# Patient Record
Sex: FEMALE | Race: WHITE | NOT HISPANIC OR LATINO | Employment: FULL TIME | URBAN - NONMETROPOLITAN AREA
[De-identification: names, ages, dates, MRNs, and addresses within clinical notes are randomized per-mention and may not be internally consistent; named-entity substitution may affect disease eponyms.]

---

## 2019-12-29 ENCOUNTER — OFFICE VISIT (OUTPATIENT)
Dept: URGENT CARE | Facility: CLINIC | Age: 34
End: 2019-12-29
Payer: COMMERCIAL

## 2019-12-29 VITALS
TEMPERATURE: 98.5 F | BODY MASS INDEX: 26.53 KG/M2 | HEIGHT: 67 IN | RESPIRATION RATE: 16 BRPM | WEIGHT: 169 LBS | OXYGEN SATURATION: 97 % | HEART RATE: 69 BPM | DIASTOLIC BLOOD PRESSURE: 78 MMHG | SYSTOLIC BLOOD PRESSURE: 118 MMHG

## 2019-12-29 DIAGNOSIS — J01.90 ACUTE NON-RECURRENT SINUSITIS, UNSPECIFIED LOCATION: ICD-10-CM

## 2019-12-29 PROCEDURE — 99203 OFFICE O/P NEW LOW 30 MIN: CPT | Performed by: FAMILY MEDICINE

## 2019-12-29 RX ORDER — AMOXICILLIN AND CLAVULANATE POTASSIUM 875; 125 MG/1; MG/1
1 TABLET, FILM COATED ORAL 2 TIMES DAILY
Qty: 10 TAB | Refills: 0 | Status: SHIPPED | OUTPATIENT
Start: 2019-12-29 | End: 2020-01-03

## 2019-12-29 ASSESSMENT — ENCOUNTER SYMPTOMS: COUGH: 1

## 2019-12-29 NOTE — PATIENT INSTRUCTIONS
Oral antibiotic twice daily as directed for 5 days  Recommend nasal saline irrigation also known as Selena pot  You could benefit from nasal decongestant, for instance Claritin-D daily as needed  Follow up if not significantly improved as expected in 7 days, sooner if any worsening or new symptoms

## 2019-12-29 NOTE — PROGRESS NOTES
"subjective:      [place]ana Koenig is a 34 y.o. female who presents with Sinus Problem (started 12- )            This is a new problem.  34-year-old otherwise healthy presenting for 10-day history of progressive worsening sinus congestion and pain.  She denies any history of sinus surgery or frequent sinus infection.  No fever or chills reported.  She has been using nasal saline spray without improvement.  She is not taking antihistamine or decongestant.      Review of Systems   Respiratory: Positive for cough.    All other systems reviewed and are negative.         Objective:     /78   Pulse 69   Temp 36.9 °C (98.5 °F) (Temporal)   Resp 16   Ht 1.702 m (5' 7\")   Wt 76.7 kg (169 lb)   SpO2 97%   BMI 26.47 kg/m²      Physical Exam  Constitutional:       General: She is not in acute distress.     Appearance: She is not ill-appearing, toxic-appearing or diaphoretic.   HENT:      Head: Normocephalic and atraumatic.      Right Ear: Tympanic membrane, ear canal and external ear normal.      Left Ear: Tympanic membrane, ear canal and external ear normal.      Nose: Congestion present.      Right Sinus: Maxillary sinus tenderness present. No frontal sinus tenderness.      Left Sinus: Maxillary sinus tenderness present. No frontal sinus tenderness.      Mouth/Throat:      Mouth: Mucous membranes are moist. No injury.      Pharynx: Oropharynx is clear. Uvula midline. No pharyngeal swelling, oropharyngeal exudate, posterior oropharyngeal erythema or uvula swelling.      Tonsils: No tonsillar exudate or tonsillar abscesses.   Eyes:      General: No scleral icterus.     Conjunctiva/sclera: Conjunctivae normal.   Neck:      Musculoskeletal: Neck supple. No muscular tenderness.   Cardiovascular:      Rate and Rhythm: Normal rate and regular rhythm.      Heart sounds: No murmur. No friction rub. No gallop.    Pulmonary:      Effort: Pulmonary effort is normal. No respiratory distress.      Breath sounds: No " stridor. No wheezing, rhonchi or rales.   Lymphadenopathy:      Cervical: No cervical adenopathy.   Skin:     General: Skin is warm.      Coloration: Skin is not jaundiced or pale.      Findings: No bruising, erythema or rash.   Neurological:      Mental Status: She is alert and oriented to person, place, and time.   Psychiatric:         Mood and Affect: Mood normal.             Assessment/Plan:       1. Acute non-recurrent sinusitis, unspecified location  - amoxicillin-clavulanate (AUGMENTIN) 875-125 MG Tab; Take 1 Tab by mouth 2 times a day for 5 days.  Dispense: 10 Tab; Refill: 0      Continue symptomatic care  Plan per orders and instructions  Warning signs reviewed

## 2021-04-20 ENCOUNTER — HOSPITAL ENCOUNTER (INPATIENT)
Facility: MEDICAL CENTER | Age: 36
LOS: 1 days | DRG: 833 | End: 2021-04-22
Attending: OBSTETRICS & GYNECOLOGY | Admitting: OBSTETRICS & GYNECOLOGY
Payer: COMMERCIAL

## 2021-04-20 LAB
ALBUMIN SERPL BCP-MCNC: 3.9 G/DL (ref 3.2–4.9)
ALBUMIN/GLOB SERPL: 1.1 G/DL
ALP SERPL-CCNC: 83 U/L (ref 30–99)
ALT SERPL-CCNC: 30 U/L (ref 2–50)
ANION GAP SERPL CALC-SCNC: 12 MMOL/L (ref 7–16)
APPEARANCE UR: ABNORMAL
AST SERPL-CCNC: 21 U/L (ref 12–45)
BACTERIA #/AREA URNS HPF: ABNORMAL /HPF
BASOPHILS # BLD AUTO: 0.3 % (ref 0–1.8)
BASOPHILS # BLD: 0.03 K/UL (ref 0–0.12)
BILIRUB SERPL-MCNC: 0.2 MG/DL (ref 0.1–1.5)
BILIRUB UR QL STRIP.AUTO: NEGATIVE
BUN SERPL-MCNC: 12 MG/DL (ref 8–22)
CALCIUM SERPL-MCNC: 9.8 MG/DL (ref 8.5–10.5)
CHLORIDE SERPL-SCNC: 101 MMOL/L (ref 96–112)
CO2 SERPL-SCNC: 21 MMOL/L (ref 20–33)
COLOR UR: YELLOW
CREAT SERPL-MCNC: 0.49 MG/DL (ref 0.5–1.4)
CREAT UR-MCNC: 61.74 MG/DL
EOSINOPHIL # BLD AUTO: 0.07 K/UL (ref 0–0.51)
EOSINOPHIL NFR BLD: 0.7 % (ref 0–6.9)
EPI CELLS #/AREA URNS HPF: ABNORMAL /HPF
ERYTHROCYTE [DISTWIDTH] IN BLOOD BY AUTOMATED COUNT: 47.4 FL (ref 35.9–50)
GLOBULIN SER CALC-MCNC: 3.6 G/DL (ref 1.9–3.5)
GLUCOSE SERPL-MCNC: 84 MG/DL (ref 65–99)
GLUCOSE UR STRIP.AUTO-MCNC: NEGATIVE MG/DL
HCT VFR BLD AUTO: 36.7 % (ref 37–47)
HGB BLD-MCNC: 12.1 G/DL (ref 12–16)
IMM GRANULOCYTES # BLD AUTO: 0.28 K/UL (ref 0–0.11)
IMM GRANULOCYTES NFR BLD AUTO: 2.6 % (ref 0–0.9)
KETONES UR STRIP.AUTO-MCNC: NEGATIVE MG/DL
LEUKOCYTE ESTERASE UR QL STRIP.AUTO: ABNORMAL
LYMPHOCYTES # BLD AUTO: 1.2 K/UL (ref 1–4.8)
LYMPHOCYTES NFR BLD: 11.3 % (ref 22–41)
MCH RBC QN AUTO: 31.8 PG (ref 27–33)
MCHC RBC AUTO-ENTMCNC: 33 G/DL (ref 33.6–35)
MCV RBC AUTO: 96.6 FL (ref 81.4–97.8)
MICRO URNS: ABNORMAL
MONOCYTES # BLD AUTO: 0.84 K/UL (ref 0–0.85)
MONOCYTES NFR BLD AUTO: 7.9 % (ref 0–13.4)
NEUTROPHILS # BLD AUTO: 8.17 K/UL (ref 2–7.15)
NEUTROPHILS NFR BLD: 77.2 % (ref 44–72)
NITRITE UR QL STRIP.AUTO: NEGATIVE
NRBC # BLD AUTO: 0 K/UL
NRBC BLD-RTO: 0 /100 WBC
PH UR STRIP.AUTO: 7 [PH] (ref 5–8)
PLATELET # BLD AUTO: 231 K/UL (ref 164–446)
PMV BLD AUTO: 10.5 FL (ref 9–12.9)
POTASSIUM SERPL-SCNC: 4.1 MMOL/L (ref 3.6–5.5)
PROT SERPL-MCNC: 7.5 G/DL (ref 6–8.2)
PROT UR QL STRIP: NEGATIVE MG/DL
PROT UR-MCNC: 15 MG/DL (ref 0–15)
PROT/CREAT UR: 243 MG/G (ref 10–107)
RBC # BLD AUTO: 3.8 M/UL (ref 4.2–5.4)
RBC # URNS HPF: ABNORMAL /HPF
RBC UR QL AUTO: NEGATIVE
SARS-COV+SARS-COV-2 AG RESP QL IA.RAPID: NOTDETECTED
SARS-COV-2 RNA RESP QL NAA+PROBE: NOTDETECTED
SODIUM SERPL-SCNC: 134 MMOL/L (ref 135–145)
SP GR UR STRIP.AUTO: 1.01
SPECIMEN SOURCE: NORMAL
SPECIMEN SOURCE: NORMAL
URATE SERPL-MCNC: 4.7 MG/DL (ref 1.9–8.2)
UROBILINOGEN UR STRIP.AUTO-MCNC: 0.2 MG/DL
WBC # BLD AUTO: 10.6 K/UL (ref 4.8–10.8)
WBC #/AREA URNS HPF: ABNORMAL /HPF

## 2021-04-20 PROCEDURE — 700102 HCHG RX REV CODE 250 W/ 637 OVERRIDE(OP): Performed by: OBSTETRICS & GYNECOLOGY

## 2021-04-20 PROCEDURE — 84156 ASSAY OF PROTEIN URINE: CPT | Mod: 91

## 2021-04-20 PROCEDURE — A9270 NON-COVERED ITEM OR SERVICE: HCPCS | Performed by: OBSTETRICS & GYNECOLOGY

## 2021-04-20 PROCEDURE — 80053 COMPREHEN METABOLIC PANEL: CPT

## 2021-04-20 PROCEDURE — 81050 URINALYSIS VOLUME MEASURE: CPT

## 2021-04-20 PROCEDURE — 85025 COMPLETE CBC W/AUTO DIFF WBC: CPT

## 2021-04-20 PROCEDURE — 59025 FETAL NON-STRESS TEST: CPT

## 2021-04-20 PROCEDURE — 87426 SARSCOV CORONAVIRUS AG IA: CPT

## 2021-04-20 PROCEDURE — 36415 COLL VENOUS BLD VENIPUNCTURE: CPT

## 2021-04-20 PROCEDURE — 82570 ASSAY OF URINE CREATININE: CPT

## 2021-04-20 PROCEDURE — U0005 INFEC AGEN DETEC AMPLI PROBE: HCPCS

## 2021-04-20 PROCEDURE — 81001 URINALYSIS AUTO W/SCOPE: CPT

## 2021-04-20 PROCEDURE — U0003 INFECTIOUS AGENT DETECTION BY NUCLEIC ACID (DNA OR RNA); SEVERE ACUTE RESPIRATORY SYNDROME CORONAVIRUS 2 (SARS-COV-2) (CORONAVIRUS DISEASE [COVID-19]), AMPLIFIED PROBE TECHNIQUE, MAKING USE OF HIGH THROUGHPUT TECHNOLOGIES AS DESCRIBED BY CMS-2020-01-R: HCPCS

## 2021-04-20 PROCEDURE — 84550 ASSAY OF BLOOD/URIC ACID: CPT

## 2021-04-20 PROCEDURE — 302449 STATCHG TRIAGE ONLY (STATISTIC)

## 2021-04-20 RX ORDER — LABETALOL 100 MG/1
200 TABLET, FILM COATED ORAL EVERY 8 HOURS
Status: DISCONTINUED | OUTPATIENT
Start: 2021-04-20 | End: 2021-04-21

## 2021-04-20 RX ORDER — NIFEDIPINE 10 MG/1
10 CAPSULE ORAL ONCE
Status: COMPLETED | OUTPATIENT
Start: 2021-04-20 | End: 2021-04-20

## 2021-04-20 RX ORDER — NIFEDIPINE 90 MG/1
90 TABLET, FILM COATED, EXTENDED RELEASE ORAL DAILY
COMMUNITY

## 2021-04-20 RX ORDER — FERROUS GLUCONATE 324(38)MG
324 TABLET ORAL
COMMUNITY

## 2021-04-20 RX ORDER — VITAMIN B COMPLEX
1000 TABLET ORAL DAILY
COMMUNITY

## 2021-04-20 RX ORDER — NIFEDIPINE 30 MG/1
90 TABLET, EXTENDED RELEASE ORAL
Status: DISCONTINUED | OUTPATIENT
Start: 2021-04-21 | End: 2021-04-22 | Stop reason: HOSPADM

## 2021-04-20 RX ADMIN — LABETALOL HYDROCHLORIDE 200 MG: 100 TABLET, FILM COATED ORAL at 22:11

## 2021-04-20 RX ADMIN — LABETALOL HYDROCHLORIDE 200 MG: 100 TABLET, FILM COATED ORAL at 13:29

## 2021-04-20 RX ADMIN — NIFEDIPINE 10 MG: 10 CAPSULE ORAL at 11:51

## 2021-04-20 RX ADMIN — NIFEDIPINE 10 MG: 10 CAPSULE ORAL at 22:40

## 2021-04-20 ASSESSMENT — LIFESTYLE VARIABLES
ALCOHOL_USE: NO
EVER HAD A DRINK FIRST THING IN THE MORNING TO STEADY YOUR NERVES TO GET RID OF A HANGOVER: NO
EVER_SMOKED: NEVER
TOTAL SCORE: 0
CONSUMPTION TOTAL: NEGATIVE
DOES PATIENT WANT TO STOP DRINKING: NO
AVERAGE NUMBER OF DAYS PER WEEK YOU HAVE A DRINK CONTAINING ALCOHOL: 0
ON A TYPICAL DAY WHEN YOU DRINK ALCOHOL HOW MANY DRINKS DO YOU HAVE: 0
TOTAL SCORE: 0
HOW MANY TIMES IN THE PAST YEAR HAVE YOU HAD 5 OR MORE DRINKS IN A DAY: 0
EVER FELT BAD OR GUILTY ABOUT YOUR DRINKING: NO
HAVE PEOPLE ANNOYED YOU BY CRITICIZING YOUR DRINKING: NO
TOTAL SCORE: 0
HAVE YOU EVER FELT YOU SHOULD CUT DOWN ON YOUR DRINKING: NO

## 2021-04-20 ASSESSMENT — COPD QUESTIONNAIRES
DURING THE PAST 4 WEEKS HOW MUCH DID YOU FEEL SHORT OF BREATH: NONE/LITTLE OF THE TIME
COPD SCREENING SCORE: 0
HAVE YOU SMOKED AT LEAST 100 CIGARETTES IN YOUR ENTIRE LIFE: NO/DON'T KNOW
DO YOU EVER COUGH UP ANY MUCUS OR PHLEGM?: NO/ONLY WITH OCCASIONAL COLDS OR INFECTIONS
IN THE PAST 12 MONTHS DO YOU DO LESS THAN YOU USED TO BECAUSE OF YOUR BREATHING PROBLEMS: DISAGREE/UNSURE

## 2021-04-20 ASSESSMENT — PATIENT HEALTH QUESTIONNAIRE - PHQ9
1. LITTLE INTEREST OR PLEASURE IN DOING THINGS: NOT AT ALL
2. FEELING DOWN, DEPRESSED, IRRITABLE, OR HOPELESS: NOT AT ALL
SUM OF ALL RESPONSES TO PHQ9 QUESTIONS 1 AND 2: 0

## 2021-04-20 ASSESSMENT — PAIN SCALES - GENERAL: PAINLEVEL: 0 - NO PAIN

## 2021-04-20 NOTE — CARE PLAN
Problem: Knowledge Deficit  Goal: Patient/Support Person demonstrates understanding regarding condition, prognosis and treatment needs during the pregnancy  Outcome: PROGRESSING AS EXPECTED  Note: Pt is up to date on the POC. She will ask questions as needed and the RN will notify her of any changes.     Problem: Risk for Deep Vein Thrombosis/Venous Thromboembolism  Goal: DVT/VTE Prevention Measures in Place  Outcome: PROGRESSING AS EXPECTED  Note: Pt able to move ambulate to help maintain adequate blood perfusion

## 2021-04-20 NOTE — PROGRESS NOTES
1200. Report from Bibi GALICIA at the bedside. POC discussed and resumed. Saline lock placed, labs drawn and sent. Pt transferred to [id number].     1230. Covid swabs obtained and sent, admission completed. Pt denies uc's, leaking or bleeding and notes +FM. Pt denies HA, spots in her vision, epigastric pain and swelling. Pt states that her EDC is 21= 29.2 weeks. Pt has a history of  delivery via c/s at 29.5 weeks, with a PPH, D&C and blood transfusion.     1900. Report to RN. POC discussed.

## 2021-04-21 LAB
PROT 24H UR-MCNC: 240 MG/24 HR (ref 30–150)
PROT 24H UR-MRATE: 10 MG/DL (ref 0–15)
SPECIMEN VOL UR: 2400 ML

## 2021-04-21 PROCEDURE — 700102 HCHG RX REV CODE 250 W/ 637 OVERRIDE(OP): Performed by: OBSTETRICS & GYNECOLOGY

## 2021-04-21 PROCEDURE — A9270 NON-COVERED ITEM OR SERVICE: HCPCS | Performed by: OBSTETRICS & GYNECOLOGY

## 2021-04-21 PROCEDURE — 302790 HCHG STAT ANTEPARTUM CARE, DAILY

## 2021-04-21 PROCEDURE — 59025 FETAL NON-STRESS TEST: CPT

## 2021-04-21 PROCEDURE — 770002 HCHG ROOM/CARE - OB PRIVATE (112)

## 2021-04-21 RX ORDER — LEVOTHYROXINE SODIUM 0.07 MG/1
75 TABLET ORAL
Status: DISCONTINUED | OUTPATIENT
Start: 2021-04-21 | End: 2021-04-22 | Stop reason: HOSPADM

## 2021-04-21 RX ORDER — LABETALOL 300 MG/1
300 TABLET, FILM COATED ORAL EVERY 8 HOURS
Status: DISCONTINUED | OUTPATIENT
Start: 2021-04-21 | End: 2021-04-22 | Stop reason: HOSPADM

## 2021-04-21 RX ADMIN — NIFEDIPINE 90 MG: 30 TABLET, FILM COATED, EXTENDED RELEASE ORAL at 08:37

## 2021-04-21 RX ADMIN — LABETALOL HYDROCHLORIDE 200 MG: 100 TABLET, FILM COATED ORAL at 14:26

## 2021-04-21 RX ADMIN — LABETALOL HYDROCHLORIDE 200 MG: 100 TABLET, FILM COATED ORAL at 06:13

## 2021-04-21 RX ADMIN — LABETALOL HYDROCHLORIDE 300 MG: 300 TABLET, FILM COATED ORAL at 22:06

## 2021-04-21 RX ADMIN — LEVOTHYROXINE SODIUM 75 MCG: 0.07 TABLET ORAL at 07:12

## 2021-04-21 ASSESSMENT — PAIN DESCRIPTION - PAIN TYPE: TYPE: ACUTE PAIN

## 2021-04-21 NOTE — H&P
DATE OF ADMISSION:  2021     REASON FOR ADMISSION:  Hypertension.     HISTORY OF PRESENT ILLNESS:  This is a 36-year-old  AB3 with an   EDC of 2021 who is currently at 29 weeks and 2 days.  The patient is   known to have history of hypertension and previous pregnancy was complicated   by  birth induced, resulting from hypertension with superimposed   preeclampsia.     The patient was seen today and is currently on Procardia extended release 90   mg per day, labetalol 100 mg 3 times a day and was observed to have a blood   pressure 159/90.  Of note, she has gained approximately 5-1/2 pounds since   2021.  Due to the elevated blood pressure, advised the patient to be   sent to labor and delivery for evaluation for possible superimposed severe   preeclampsia.     In triage, the patient had elevated blood pressure, requiring emergency   antihypertensive therapy, who responded to nifedipine 10 mg p.o.  Due to this   requirement, the patient was therefore admitted for observation and 24-hour   urine total protein.     PAST MEDICAL HISTORY:  She is known hypertensive and is currently on Procardia   extended release 90 mg per day and labetalol 100 mg 3 times a day.  During   this pregnancy, she had a low-risk NIPT result and her 24-hour urine total   protein was negative at the beginning of this pregnancy.     Patient currently denies any headache, visual disturbance or epigastric pain.     Denies any history of asthma, seizures, diabetes, cardiovascular, respiratory,   GI,  or other endocrine disorders.     PREVIOUS OPERATIONS:   section.     PAST OBSTETRICAL HISTORY:  In , delivered at 29 weeks and 5 days liveborn   male, 3 pound 2 ounce,  section for severe hypertension, superimposed   preeclampsia complicated by postpartum hemorrhage.  In 2019, she had   spontaneous AB.  In 2019, she had a second spontaneous AB.  In ,   spontaneous AB.     TRANSFUSIONS:  In .      ALLERGIES:  SULFA CAUSES A RASH.     HABITS:  None.     CURRENT MEDICATIONS:  In addition to the above, she is on Synthroid 75 mcg per   day.     VENEREAL DISEASE HISTORY:  Negative.     FAMILY HISTORY:  Significant for hypertension.     PHYSICAL EXAMINATION:  GENERAL:  Well-developed, well-nourished female, alert and oriented x3, no   acute distress.  HEENT:  Within normal limits.  LUNGS:  Clear.  HEART:  Regular rate and rhythm, normal S1 and S2.  No S3, S4 or murmurs.  ABDOMEN:  Soft, gravid uterus.  EXTREMITIES:  No edema or varicosities.  NEUROLOGIC:  Cranial nerves II-XII grossly intact.     DIAGNOSTIC DATA:  Ultrasound performed today in the office revealed composite   gestational age 31 weeks and 6 days, large for dates.  Amniotic fluid is   normal at 18.5.  Structurally, the baby appears to be normal.     ASSESSMENT:  1.  Intrauterine pregnancy 29 weeks and 2 days.  2.  Chronic hypertension, rule out superimposed preeclampsia.  3.  Rule out unstable hypertension.  4.  Previous  section.  5.  Hypothyroidism.     PLAN:  The patient is admitted and we will evaluate her with a 24-hour urine   total protein and will monitor blood pressures.  We will make adjustments to   her labetalol as she has reached full dose, recommended dosing for nifedipine   XL.  All questions answered to satisfaction.    Time: 75 minutes        ______________________________  MD ESTEFANY SIMS/EVENS    DD:  2021 22:11  DT:  2021 22:53    Job#:  [id number]

## 2021-04-21 NOTE — PROGRESS NOTES
0700: Report received from AZUL Frias RN. POC discussed with pt. All questions answered.   0835: Assessment done. Pt denies UCs, LOF, or VB. Reports +FM. Denies HA, visual changes, or epigastric pain.   0924: External monitors applied. Reactive NST obtained.   1845: Dr. Andrade at bedside. Discussed POC with pt and family.   1900: Report given to ARLETTE Schreiber RN. POC discussed with pt and family.

## 2021-04-21 NOTE — PROGRESS NOTES
1900- Received report from OLAMIDE Feliciano RN. POC discussed, all questions answered.     Pt denies VB, LOF, contraction, HA, blurred vision, or epigastric pain. She states +FM.     2050- RN called Dr. Andrade regarding FHT tracing being broken. He states it is ok and I can take pt off of monitor.    2234- RN called Dr. Andrade regarding pt BP's. Orders received, see manage orders.     0700- Report given to WANDA Camacho RN. POC discussed, all questions answered.

## 2021-04-21 NOTE — CARE PLAN
Problem: Knowledge Deficit  Goal: Patient/Support Person demonstrates understanding regarding condition, prognosis and treatment needs during the pregnancy  Outcome: PROGRESSING AS EXPECTED   -POC discussed with pt. All questions answered.   Problem: Risk for Deep Vein Thrombosis/Venous Thromboembolism  Goal: DVT/VTE Prevention Measures in Place  Outcome: PROGRESSING AS EXPECTED  -Pt ambulating in room.

## 2021-04-22 VITALS
HEIGHT: 66 IN | TEMPERATURE: 98.3 F | DIASTOLIC BLOOD PRESSURE: 80 MMHG | SYSTOLIC BLOOD PRESSURE: 132 MMHG | HEART RATE: 89 BPM | OXYGEN SATURATION: 95 % | RESPIRATION RATE: 17 BRPM | WEIGHT: 210 LBS | BODY MASS INDEX: 33.75 KG/M2

## 2021-04-22 PROCEDURE — A9270 NON-COVERED ITEM OR SERVICE: HCPCS | Performed by: OBSTETRICS & GYNECOLOGY

## 2021-04-22 PROCEDURE — 59025 FETAL NON-STRESS TEST: CPT

## 2021-04-22 PROCEDURE — 700102 HCHG RX REV CODE 250 W/ 637 OVERRIDE(OP): Performed by: OBSTETRICS & GYNECOLOGY

## 2021-04-22 RX ADMIN — LABETALOL HYDROCHLORIDE 300 MG: 300 TABLET, FILM COATED ORAL at 14:06

## 2021-04-22 RX ADMIN — LABETALOL HYDROCHLORIDE 300 MG: 300 TABLET, FILM COATED ORAL at 05:54

## 2021-04-22 RX ADMIN — LEVOTHYROXINE SODIUM 75 MCG: 0.07 TABLET ORAL at 05:54

## 2021-04-22 RX ADMIN — NIFEDIPINE 90 MG: 30 TABLET, FILM COATED, EXTENDED RELEASE ORAL at 08:19

## 2021-04-22 NOTE — PROGRESS NOTES
S: Complaining of IV discomfort.  O: Patient Vitals for the past 24 hrs:   BP Temp Temp src Pulse Resp   04/21/21 1613 148/91 36.9 °C (98.4 °F) Temporal 90 --   04/21/21 1427 153/87 -- -- 88 --   04/21/21 1426 153/87 -- -- -- --   04/21/21 1209 132/85 36.7 °C (98 °F) Temporal 88 18   04/21/21 0835 132/81 36.5 °C (97.7 °F) Temporal 93 16   04/21/21 0613 156/89 36.6 °C (97.8 °F) Temporal 93 --   04/21/21 0423 138/81 36.4 °C (97.5 °F) Temporal 91 17   04/20/21 2334 149/85 36.4 °C (97.6 °F) Temporal 93 15   04/20/21 2301 156/83 -- -- 86 --   04/20/21 2234 (!) 164/91 -- -- 91 --   04/20/21 2213 (!) 171/90 -- -- 87 --   04/20/21 2011 137/97 36.6 °C (97.9 °F) Temporal 91 16   04/20/21 1957 138/93 -- -- 93 --     EFM: Baseline 140 bpm.  Accelerations and moderate variability.    No change in physical exam.    Results for ANDRE LOVE (MRN [id number]) as of 4/21/2021 18:44   Ref. Range 4/20/2021 12:00   Total Protein, 24 Hour Urine Latest Ref Range: 30.0 - 150.0 mg/24 Hr 240.0 (H)   Results for ANDRE LOVE (MRN [id number]) as of 4/21/2021 18:44   Ref. Range 4/20/2021 12:05   Sodium Latest Ref Range: 135 - 145 mmol/L 134 (L)   Potassium Latest Ref Range: 3.6 - 5.5 mmol/L 4.1   Chloride Latest Ref Range: 96 - 112 mmol/L 101   Co2 Latest Ref Range: 20 - 33 mmol/L 21   Anion Gap Latest Ref Range: 7.0 - 16.0  12.0   Glucose Latest Ref Range: 65 - 99 mg/dL 84   Bun Latest Ref Range: 8 - 22 mg/dL 12   Creatinine Latest Ref Range: 0.50 - 1.40 mg/dL 0.49 (L)   GFR If  Latest Ref Range: >60 mL/min/1.73 m 2 >60   GFR If Non  Latest Ref Range: >60 mL/min/1.73 m 2 >60   Calcium Latest Ref Range: 8.5 - 10.5 mg/dL 9.8   AST(SGOT) Latest Ref Range: 12 - 45 U/L 21   ALT(SGPT) Latest Ref Range: 2 - 50 U/L 30   Alkaline Phosphatase Latest Ref Range: 30 - 99 U/L 83   Total Bilirubin Latest Ref Range: 0.1 - 1.5 mg/dL 0.2   Albumin Latest Ref Range: 3.2 - 4.9 g/dL 3.9   Total Protein Latest Ref Range: 6.0 -  8.2 g/dL 7.5   Globulin Latest Ref Range: 1.9 - 3.5 g/dL 3.6 (H)   A-G Ratio Latest Units: g/dL 1.1   Uric Acid Latest Ref Range: 1.9 - 8.2 mg/dL 4.7   Results for ANDRE LOVE (MRN [id number]) as of 4/21/2021 18:44   Ref. Range 4/20/2021 12:05   WBC Latest Ref Range: 4.8 - 10.8 K/uL 10.6   RBC Latest Ref Range: 4.20 - 5.40 M/uL 3.80 (L)   Hemoglobin Latest Ref Range: 12.0 - 16.0 g/dL 12.1   Hematocrit Latest Ref Range: 37.0 - 47.0 % 36.7 (L)   MCV Latest Ref Range: 81.4 - 97.8 fL 96.6   MCH Latest Ref Range: 27.0 - 33.0 pg 31.8   MCHC Latest Ref Range: 33.6 - 35.0 g/dL 33.0 (L)   RDW Latest Ref Range: 35.9 - 50.0 fL 47.4   Platelet Count Latest Ref Range: 164 - 446 K/uL 231     A: IUP 29 3/7 weeks       Chronic hypertension requiring increasing antihypertensive therapy.    P: Increase her labetalol to 300 mg every 8 hours.       If BP improves, will manage as an outpatient.       Discussed treatment plan with pt and partner.    Time: 25 minutes.

## 2021-04-22 NOTE — PROGRESS NOTES
1900) Report received from SHAINA Camacho RN, POC discussed, assumed care of patient at this time, Dr Andrade at bedside to see patient, orders received from MD to remove patient's IV and only replace if needed.   ) Assessment performed. Patient is a  EDC  which makes her 29.3 weeks. Patient denies any vaginal bleeding, LOF or contractions at this time. Patient has no abdomen tenderness. States positive fetal movement. EFM an TOCO placed on patient to obtain reactive NST  0700) Report to SARA Davis RN, POC discussed

## 2021-04-22 NOTE — PROGRESS NOTES
0700- report received from QUINN Schreiber RN. Plan of care discussed.     0820- assessment done. Patient denies any contractions, leaking of fluid or any vaginal bleeding. States positive fetal movement. Patient denies any HA vision, changes or epigastric pain.     1845- Dr Andrade updated on patient status and blood pressures.     1900- report given to QUINN Schreiber RN.

## 2021-04-23 NOTE — DISCHARGE SUMMARY
DATE OF ADMISSION:  2021   DATE OF DISCHARGE:  2021     ADMISSION DIAGNOSES:   1.  Intrauterine pregnancy 29 weeks and 2 days.  2.  Chronic hypertension, rule out superimposed preeclampsia.  3.  Rule out unstable hypertension.  4.  Previous  section.  5.  Hypothyroidism.     DISCHARGE DIAGNOSES:  1.  Intrauterine pregnancy, undelivered at 29 weeks and 4 days.  2.  Chronic hypertension.  3.  Does not meet criteria for preeclampsia.  4.  Previous  section.  5.  Hypothyroidism.     BRIEF HISTORY:  This is a 36-year-old , AB3, EDC 2021 admitted at   29 weeks and 2 days with increased blood pressure.  The patient is known to   have hypertension, was on Procardia-XL 90 mg per day, labetalol 100 mg three   times a day.  She was observed to have blood pressure 159/90 and the patient   was sent to labor and delivery for evaluation.  In triage, she was noted to   have elevated blood pressure, requiring emergency antihypertensive therapy,   receiving a dose of nifedipine 10 mg p.o., which resulted in improvement of   her blood pressure.     HOSPITAL COURSE:  The patient was admitted and her labetalol was titrated up   to 300 mg three times a day.  Repeat 24-hour urine study revealed she had 240   mg of protein per day.  Her chemistry and CBC have remained normal.  BUN 12,   creatinine 0.49.  Uric acid 4.7, platelet count 231,000, hemoglobin 12.1.  The   patient is deemed to be stable and will be discharged today.  Follow up with   Dr. Paiz in approximately one week.  The patient should have NSTs twice a   week.     I asked the patient to monitor blood pressure at home twice a day and should   she have 140 systolic or 90 diastolic or greater, she is to notify Dr. Paiz.    If she is here in [place], we can be contacted.  She already has a followup   appointment with me in approximately three weeks and we will repeat her   ultrasound at that time.     The patient has been advised of the  importance of taking some time to rest,   but also to watch her diet and avoid high-sodium intake and she will continue   with her antihypertensive therapy.  We discussed should she have any evidence   of hypertension or suspect she may become preeclamptic, she should notify Dr. Paiz immediately.        ______________________________  MD ESTEFANY SIMS/SUKUMAR/TAISHA    DD:  04/22/2021 19:00  DT:  04/22/2021 19:26    Job#:  [id number]

## 2021-04-23 NOTE — PROGRESS NOTES
S: No complaints.  O: Patient Vitals for the past 24 hrs:   BP Temp Temp src Pulse Resp   21 1628 132/80 36.8 °C (98.3 °F) Temporal 89 17   21 1406 145/88 -- -- 84 --   21 1238 142/86 36.7 °C (98.1 °F) Temporal 83 17   21 0819 133/81 36.4 °C (97.6 °F) Temporal 90 16   21 0420 124/62 36.4 °C (97.6 °F) Temporal 75 17   21 0040 108/65 36.6 °C (97.9 °F) Temporal 93 16   21 2015 137/82 36.6 °C (97.9 °F) Temporal 86 16     EFM: Moderate variability and accelerations present.  ASSESEMENT  1.  Intrauterine pregnancy 29 weeks and 4 days.  2.  Chronic hypertension  3.  unstable hypertension improved control with Procardia XL 90 mg/day and labetalol 300 mg TID.  4.  Previous  section.  5.  Hypothyroidism.         PLAN: Discharge home.    Time: 15 minutes.

## 2021-04-23 NOTE — PROGRESS NOTES
1900) Report received from SARA Davis RN, POC discussed, assumed care of patient at this time, orders received to discharge patient with BP precautions.  1917) Discharge instructions given including HTN precautions, UC's, ROM, VB, abn FM, when to return to L&D, f/u with patient's physician, pelvic rest and modified bedrest. Questions answered at length. Pt and FOB verbalized understanding and agreement with POC and discharge. Discharge instructions signed.   1922) Patient ambulated out of unit with FOB in stable condition